# Patient Record
Sex: FEMALE | Race: OTHER | NOT HISPANIC OR LATINO
[De-identification: names, ages, dates, MRNs, and addresses within clinical notes are randomized per-mention and may not be internally consistent; named-entity substitution may affect disease eponyms.]

---

## 2021-10-18 ENCOUNTER — TRANSCRIPTION ENCOUNTER (OUTPATIENT)
Age: 56
End: 2021-10-18

## 2021-10-18 RX ORDER — POVIDONE-IODINE 5 %
1 AEROSOL (ML) TOPICAL ONCE
Refills: 0 | Status: COMPLETED | OUTPATIENT
Start: 2021-10-19 | End: 2021-10-19

## 2021-10-19 ENCOUNTER — RESULT REVIEW (OUTPATIENT)
Age: 56
End: 2021-10-19

## 2021-10-19 ENCOUNTER — INPATIENT (INPATIENT)
Facility: HOSPITAL | Age: 56
LOS: 2 days | Discharge: HOME | DRG: 501 | End: 2021-10-22
Attending: SPECIALIST | Admitting: SPECIALIST
Payer: COMMERCIAL

## 2021-10-19 VITALS
HEIGHT: 64 IN | WEIGHT: 119.27 LBS | TEMPERATURE: 99 F | HEART RATE: 65 BPM | RESPIRATION RATE: 16 BRPM | OXYGEN SATURATION: 100 % | DIASTOLIC BLOOD PRESSURE: 81 MMHG | SYSTOLIC BLOOD PRESSURE: 133 MMHG

## 2021-10-19 DIAGNOSIS — Z98.890 OTHER SPECIFIED POSTPROCEDURAL STATES: ICD-10-CM

## 2021-10-19 DIAGNOSIS — Z87.898 PERSONAL HISTORY OF OTHER SPECIFIED CONDITIONS: Chronic | ICD-10-CM

## 2021-10-19 LAB
ANION GAP SERPL CALC-SCNC: 12 MMOL/L — SIGNIFICANT CHANGE UP (ref 5–17)
BLD GP AB SCN SERPL QL: NEGATIVE — SIGNIFICANT CHANGE UP
BUN SERPL-MCNC: 15 MG/DL — SIGNIFICANT CHANGE UP (ref 7–23)
CALCIUM SERPL-MCNC: 9.1 MG/DL — SIGNIFICANT CHANGE UP (ref 8.4–10.5)
CHLORIDE SERPL-SCNC: 105 MMOL/L — SIGNIFICANT CHANGE UP (ref 96–108)
CO2 SERPL-SCNC: 23 MMOL/L — SIGNIFICANT CHANGE UP (ref 22–31)
CREAT SERPL-MCNC: 0.76 MG/DL — SIGNIFICANT CHANGE UP (ref 0.5–1.3)
GLUCOSE BLDC GLUCOMTR-MCNC: 125 MG/DL — HIGH (ref 70–99)
GLUCOSE SERPL-MCNC: 172 MG/DL — HIGH (ref 70–99)
HCT VFR BLD CALC: 42.6 % — SIGNIFICANT CHANGE UP (ref 34.5–45)
HGB BLD-MCNC: 13.6 G/DL — SIGNIFICANT CHANGE UP (ref 11.5–15.5)
MAGNESIUM SERPL-MCNC: 2 MG/DL — SIGNIFICANT CHANGE UP (ref 1.6–2.6)
MCHC RBC-ENTMCNC: 28.9 PG — SIGNIFICANT CHANGE UP (ref 27–34)
MCHC RBC-ENTMCNC: 31.9 GM/DL — LOW (ref 32–36)
MCV RBC AUTO: 90.6 FL — SIGNIFICANT CHANGE UP (ref 80–100)
NRBC # BLD: 0 /100 WBCS — SIGNIFICANT CHANGE UP (ref 0–0)
PHOSPHATE SERPL-MCNC: 3.5 MG/DL — SIGNIFICANT CHANGE UP (ref 2.5–4.5)
PLATELET # BLD AUTO: 218 K/UL — SIGNIFICANT CHANGE UP (ref 150–400)
POTASSIUM SERPL-MCNC: 3.9 MMOL/L — SIGNIFICANT CHANGE UP (ref 3.5–5.3)
POTASSIUM SERPL-SCNC: 3.9 MMOL/L — SIGNIFICANT CHANGE UP (ref 3.5–5.3)
RBC # BLD: 4.7 M/UL — SIGNIFICANT CHANGE UP (ref 3.8–5.2)
RBC # FLD: 11.4 % — SIGNIFICANT CHANGE UP (ref 10.3–14.5)
RH IG SCN BLD-IMP: POSITIVE — SIGNIFICANT CHANGE UP
SODIUM SERPL-SCNC: 140 MMOL/L — SIGNIFICANT CHANGE UP (ref 135–145)
WBC # BLD: 11.74 K/UL — HIGH (ref 3.8–10.5)
WBC # FLD AUTO: 11.74 K/UL — HIGH (ref 3.8–10.5)

## 2021-10-19 PROCEDURE — 99233 SBSQ HOSP IP/OBS HIGH 50: CPT

## 2021-10-19 PROCEDURE — 88300 SURGICAL PATH GROSS: CPT | Mod: 26

## 2021-10-19 RX ORDER — SODIUM CHLORIDE 9 MG/ML
1000 INJECTION INTRAMUSCULAR; INTRAVENOUS; SUBCUTANEOUS
Refills: 0 | Status: DISCONTINUED | OUTPATIENT
Start: 2021-10-19 | End: 2021-10-20

## 2021-10-19 RX ORDER — SODIUM CHLORIDE 9 MG/ML
1000 INJECTION, SOLUTION INTRAVENOUS
Refills: 0 | Status: DISCONTINUED | OUTPATIENT
Start: 2021-10-19 | End: 2021-10-19

## 2021-10-19 RX ORDER — TRAMADOL HYDROCHLORIDE 50 MG/1
50 TABLET ORAL EVERY 6 HOURS
Refills: 0 | Status: DISCONTINUED | OUTPATIENT
Start: 2021-10-19 | End: 2021-10-21

## 2021-10-19 RX ORDER — DEXTROSE 50 % IN WATER 50 %
25 SYRINGE (ML) INTRAVENOUS ONCE
Refills: 0 | Status: DISCONTINUED | OUTPATIENT
Start: 2021-10-19 | End: 2021-10-20

## 2021-10-19 RX ORDER — DEXTROSE 50 % IN WATER 50 %
12.5 SYRINGE (ML) INTRAVENOUS ONCE
Refills: 0 | Status: DISCONTINUED | OUTPATIENT
Start: 2021-10-19 | End: 2021-10-19

## 2021-10-19 RX ORDER — INSULIN LISPRO 100/ML
VIAL (ML) SUBCUTANEOUS
Refills: 0 | Status: DISCONTINUED | OUTPATIENT
Start: 2021-10-19 | End: 2021-10-20

## 2021-10-19 RX ORDER — CEFAZOLIN SODIUM 1 G
1000 VIAL (EA) INJECTION EVERY 8 HOURS
Refills: 0 | Status: DISCONTINUED | OUTPATIENT
Start: 2021-10-19 | End: 2021-10-21

## 2021-10-19 RX ORDER — FENTANYL CITRATE 50 UG/ML
12.5 INJECTION INTRAVENOUS
Refills: 0 | Status: DISCONTINUED | OUTPATIENT
Start: 2021-10-19 | End: 2021-10-20

## 2021-10-19 RX ORDER — METOCLOPRAMIDE HCL 10 MG
10 TABLET ORAL ONCE
Refills: 0 | Status: COMPLETED | OUTPATIENT
Start: 2021-10-19 | End: 2021-10-19

## 2021-10-19 RX ORDER — SENNA PLUS 8.6 MG/1
2 TABLET ORAL AT BEDTIME
Refills: 0 | Status: DISCONTINUED | OUTPATIENT
Start: 2021-10-19 | End: 2021-10-22

## 2021-10-19 RX ORDER — CEFAZOLIN SODIUM 1 G
1000 VIAL (EA) INJECTION EVERY 8 HOURS
Refills: 0 | Status: DISCONTINUED | OUTPATIENT
Start: 2021-10-19 | End: 2021-10-19

## 2021-10-19 RX ORDER — DEXTROSE 50 % IN WATER 50 %
25 SYRINGE (ML) INTRAVENOUS ONCE
Refills: 0 | Status: DISCONTINUED | OUTPATIENT
Start: 2021-10-19 | End: 2021-10-19

## 2021-10-19 RX ORDER — ONDANSETRON 8 MG/1
4 TABLET, FILM COATED ORAL EVERY 6 HOURS
Refills: 0 | Status: DISCONTINUED | OUTPATIENT
Start: 2021-10-19 | End: 2021-10-22

## 2021-10-19 RX ORDER — CHLORHEXIDINE GLUCONATE 213 G/1000ML
1 SOLUTION TOPICAL EVERY 12 HOURS
Refills: 0 | Status: DISCONTINUED | OUTPATIENT
Start: 2021-10-19 | End: 2021-10-19

## 2021-10-19 RX ORDER — DEXTROSE 50 % IN WATER 50 %
15 SYRINGE (ML) INTRAVENOUS ONCE
Refills: 0 | Status: DISCONTINUED | OUTPATIENT
Start: 2021-10-19 | End: 2021-10-19

## 2021-10-19 RX ORDER — METOCLOPRAMIDE HCL 10 MG
10 TABLET ORAL ONCE
Refills: 0 | Status: DISCONTINUED | OUTPATIENT
Start: 2021-10-19 | End: 2021-10-19

## 2021-10-19 RX ORDER — ACETAMINOPHEN 500 MG
1000 TABLET ORAL EVERY 6 HOURS
Refills: 0 | Status: COMPLETED | OUTPATIENT
Start: 2021-10-19 | End: 2021-10-20

## 2021-10-19 RX ORDER — SODIUM CHLORIDE 9 MG/ML
1000 INJECTION INTRAMUSCULAR; INTRAVENOUS; SUBCUTANEOUS
Refills: 0 | Status: DISCONTINUED | OUTPATIENT
Start: 2021-10-19 | End: 2021-10-19

## 2021-10-19 RX ORDER — GLUCAGON INJECTION, SOLUTION 0.5 MG/.1ML
1 INJECTION, SOLUTION SUBCUTANEOUS ONCE
Refills: 0 | Status: DISCONTINUED | OUTPATIENT
Start: 2021-10-19 | End: 2021-10-19

## 2021-10-19 RX ADMIN — FENTANYL CITRATE 12.5 MICROGRAM(S): 50 INJECTION INTRAVENOUS at 18:36

## 2021-10-19 RX ADMIN — TRAMADOL HYDROCHLORIDE 50 MILLIGRAM(S): 50 TABLET ORAL at 18:36

## 2021-10-19 RX ADMIN — Medication 5 MILLIGRAM(S): at 21:24

## 2021-10-19 RX ADMIN — Medication 100 MILLIGRAM(S): at 21:24

## 2021-10-19 RX ADMIN — Medication 1 APPLICATION(S): at 10:25

## 2021-10-19 RX ADMIN — CHLORHEXIDINE GLUCONATE 1 APPLICATION(S): 213 SOLUTION TOPICAL at 10:26

## 2021-10-19 RX ADMIN — Medication 10 MILLIGRAM(S): at 18:00

## 2021-10-19 RX ADMIN — FENTANYL CITRATE 12.5 MICROGRAM(S): 50 INJECTION INTRAVENOUS at 17:27

## 2021-10-19 RX ADMIN — SENNA PLUS 2 TABLET(S): 8.6 TABLET ORAL at 21:23

## 2021-10-19 RX ADMIN — TRAMADOL HYDROCHLORIDE 50 MILLIGRAM(S): 50 TABLET ORAL at 17:56

## 2021-10-19 RX ADMIN — ONDANSETRON 4 MILLIGRAM(S): 8 TABLET, FILM COATED ORAL at 17:26

## 2021-10-19 RX ADMIN — Medication 1000 MILLIGRAM(S): at 19:51

## 2021-10-19 RX ADMIN — Medication 1000 MILLIGRAM(S): at 20:30

## 2021-10-19 NOTE — PROGRESS NOTE ADULT - SUBJECTIVE AND OBJECTIVE BOX
NEUROSURGERY POST OP NOTE:    POD# 0 S/P cranioplasty revision, removal of hardware and resuspension of temporalis muscle for a right frontal and temporal deformity     S: Patient currently in PACU, reports 8/10 incisional pain and nausea. subgaleal ERICK and head wrap in place.       T(C): 37.1 (10-19-21 @ 10:12), Max: 37.1 (10-19-21 @ 10:12)  HR: 50 (10-19-21 @ 16:45) (49 - 65)  BP: 146/71 (10-19-21 @ 16:45) (133/81 - 164/79)  RR: 11 (10-19-21 @ 16:45) (11 - 16)  SpO2: 100% (10-19-21 @ 16:45) (99% - 100%)        acetaminophen   Tablet .. 1000 milliGRAM(s) Oral every 6 hours  ceFAZolin   IVPB 1000 milliGRAM(s) IV Intermittent every 8 hours  dextrose 50% Injectable 25 Gram(s) IV Push once  fentaNYL    Injectable 12.5 MICROGram(s) IV Push every 3 hours PRN  insulin lispro (ADMELOG) corrective regimen sliding scale   SubCutaneous Before meals and at bedtime  metoclopramide 10 milliGRAM(s) Oral once  ondansetron Injectable 4 milliGRAM(s) IV Push every 6 hours PRN  sodium chloride 0.9%. 1000 milliLiter(s) IV Continuous <Continuous>  traMADol 50 milliGRAM(s) Oral every 6 hours PRN      RADIOLOGY:     Exam:  Constitutional:  56 y/o female awake, alert in no acute distress.  Eyes:  Sclera anicteric, conjunctiva noninjected.  ENMT: Oropharyngeal mucosa moist, pink. Tongue midline.    Neck: Neck supple, FROM.  No appreciable lymphadenopathy.  Back:  No pain to palpation/percussion of low back. No CVA tenderness.  Respiratory: normal chest rise  Cardiovascular: Regular rate and rhythm.   Gastrointestinal:  Soft, nontender, nondistended.   Genitourinary:  Deferred.  Rectal: Deferred.  Vascular: Extremities warm, no ulcers, no discoloration of skin.   Neurological: Gen: AA&O x 3, conversant, appropriate.      CN II-XII grossly intact.    Motor: RESTREPO x 4, 5/5 throughout UE/LE.    Sens: Sensation intact to light touch throughout.    DTRs: 2+ symmetric throughout.    Hoffmans negative bilaterally.  Plantar downgoing bilaterally.  No clonus.      No pronator drift, no dysmetria.  Skin: Warm, dry, no erythema. head wrap and right subgaleal ERICK drain in place.       WOUND/DRAINS:  - right subgaleal drain     DEVICES:       Assessment: 6 yo female, R handed with PMHx of pancreatic cyst, renal cyst, microscopic hematuria,  Right Craniotomy resection of meningioma in 2016, presented for an elective cranioplasty revision, removal of hardware and resuspension of temporalis muscle due to R temporal deformity.       Plan:  NEURO:  - neuro ICU overnight  - neuro checks q1hr  - monitor ERCIK drain outputs  - Fentanyl 12.5mcg IVP q3hrs PRN  - Tramadol 50mg PO q6hrs PRN  - Acetaminophen 1g q6hr    CARDIO:  - normotensive BP     PULM:  - satting well on RA    GI:  - bowel regimen  - advance diet as tolerated    RENAL:  - IVF   - normonatremia    HEME:  - SCDs    ID:  - afebrile  - post-op Ancef    ENDO:  - ISS  - normal glucose goal    DISPO:  - pend PT/OT    Case discussed with Dr. Mackay         NEUROSURGERY POST OP NOTE:    POD# 0 S/P cranioplasty revision, removal of hardware and resuspension of temporalis muscle for a right frontal and temporal deformity     S: Patient currently in PACU, reports 8/10 incisional pain and nausea. subgaleal ERICK and head wrap in place.       T(C): 37.1 (10-19-21 @ 10:12), Max: 37.1 (10-19-21 @ 10:12)  HR: 50 (10-19-21 @ 16:45) (49 - 65)  BP: 146/71 (10-19-21 @ 16:45) (133/81 - 164/79)  RR: 11 (10-19-21 @ 16:45) (11 - 16)  SpO2: 100% (10-19-21 @ 16:45) (99% - 100%)        acetaminophen   Tablet .. 1000 milliGRAM(s) Oral every 6 hours  ceFAZolin   IVPB 1000 milliGRAM(s) IV Intermittent every 8 hours  dextrose 50% Injectable 25 Gram(s) IV Push once  fentaNYL    Injectable 12.5 MICROGram(s) IV Push every 3 hours PRN  insulin lispro (ADMELOG) corrective regimen sliding scale   SubCutaneous Before meals and at bedtime  metoclopramide 10 milliGRAM(s) Oral once  ondansetron Injectable 4 milliGRAM(s) IV Push every 6 hours PRN  sodium chloride 0.9%. 1000 milliLiter(s) IV Continuous <Continuous>  traMADol 50 milliGRAM(s) Oral every 6 hours PRN      RADIOLOGY:       Exam:  Constitutional:  56 y/o female awake, alert in no acute distress.  Eyes:  Sclera anicteric, conjunctiva noninjected.  ENMT: Oropharyngeal mucosa moist, pink. Tongue midline.    Neck: Neck supple, FROM.  No appreciable lymphadenopathy.  Back:  No pain to palpation/percussion of low back. No CVA tenderness.  Respiratory: normal chest rise  Cardiovascular: Regular rate and rhythm.   Gastrointestinal:  Soft, nontender, nondistended.   Genitourinary:  Deferred.  Rectal: Deferred.  Vascular: Extremities warm, no ulcers, no discoloration of skin.   Neurological: Gen: AA&O x 3, conversant, appropriate.      CN II-XII grossly intact.    Motor: RESTREPO x 4, 5/5 throughout UE/LE.    Sens: Sensation intact to light touch throughout.    DTRs: 2+ symmetric throughout.    Hoffmans negative bilaterally.  Plantar downgoing bilaterally.  No clonus.      No pronator drift, no dysmetria.  Skin: Warm, dry, no erythema. head wrap and right subgaleal ERICK drain in place.       WOUND/DRAINS:  - right subgaleal drain     DEVICES:       Assessment: 54 yo female, R handed with PMHx of pancreatic cyst, renal cyst, microscopic hematuria,  Right Craniotomy resection of meningioma in 2016, presented for an elective cranioplasty revision, removal of hardware and resuspension of temporalis muscle due to R temporal deformity.       Plan:  NEURO:  - neuro ICU overnight  - neuro checks q1hr  - monitor ERICK drain outputs  - Fentanyl 12.5mcg IVP q3hrs PRN  - Tramadol 50mg PO q6hrs PRN  - Acetaminophen 1g q6hr    CARDIO:  - normotensive BP     PULM:  - satting well on RA    GI:  - bowel regimen  - advance diet as tolerated    RENAL:  - IVF   - normonatremia    HEME:  - SCDs    ID:  - afebrile  - post-op Ancef    ENDO:  - ISS  - normal glucose goal    DISPO:  - pend PT/OT    Case discussed with Dr. Mackay

## 2021-10-19 NOTE — BRIEF OPERATIVE NOTE - NSICDXBRIEFPROCEDURE_GEN_ALL_CORE_FT
PROCEDURES:  Cranioplasty for skull defect up to 5 cm diameter 19-Oct-2021 14:49:32 cranioplasty revision, removal of hardware and resuspensionof temporalis muscle for a right frontal and temporal deformity Ji Olmos

## 2021-10-19 NOTE — H&P ADULT - HISTORY OF PRESENT ILLNESS
Pt is 54 yo female, R handed, PMH: pancreatic cyst, renal cyst, microscopic hematuria, 2016 Right Craniotomy resection of meningioma, presents for an elective  cranioplasty revision, removal of hardware and resuspension of temporalis muscle due to R temporal deformity and pain/discomfort when lays on right side of her head . Pt denies sob, cp, n/v, chills/fevers, dysuria.

## 2021-10-19 NOTE — H&P ADULT - ASSESSMENT
Pt is 56 yo female, R handed, PMH: pancreatic cyst, renal cyst, microscopic hematuria, 2016 Right Craniotomy resection of meningioma, presents for an elective  cranioplasty revision, removal of hardware and resuspension of temporalis muscle due to R temporal deformity and pain/discomfort when lays on right side of her head . Pt denies sob, cp, n/v, chills/fevers, dysuria.

## 2021-10-19 NOTE — PROGRESS NOTE ADULT - ASSESSMENT
Assessment: 54 y/o female POD 0 s/p elective cranioplasty revision, removal of hardware and resuspension of temporalis muscle.    Plan:  NEURO:  Neurochecks q1hr  Nonitor ERICK drain outputs  Analgesics prn   Activity: Bedrest for now.     CARDIO:  -150    PULM:  Incentive spirometry  Keep sats >92%    GI:  Advance diet as tolerated  Bowel regimen    RENAL:  IV hydration for now  IVL once tolerating  Replete lytes prn    HEME:  Post op labs  VTE ppx: SCDs.     ID:  Afebrile  Mild post op leukocytosis- likely reactive.   Afebrile  Post-op Ancef. Continue Ancef until drain removed    ENDO:  ISS    SOCIAL/FAMILY:  [x] awaiting [] updated at bedside [] family meeting    CODE STATUS:  [x] Full Code [] DNR [] DNI [] Palliative/Comfort Care    DISPOSITION:  [x] ICU [] Stroke Unit [] Floor [] EMU [] RCU [] PCU      Time spent: 40 critical care minutes

## 2021-10-19 NOTE — PROGRESS NOTE ADULT - SUBJECTIVE AND OBJECTIVE BOX
HPI:  Pt is 56 y/o right handed female with PMH right craniotomy resection of meningioma in 2016. Also has history of pancreatic cyst, renal cyst, microscopic hematuria. She presented for an elective cranioplasty revision, removal of hardware and resuspension of temporalis muscle due to R temporal deformity and pain/discomfort when lays on right side of her head. Pt denies sob, cp, n/v, chills/fevers, dysuria. (19 Oct 2021 11:45)    On admission, the patient was:  GCS: 15  Hunt-Torres:  modified Rios:  ICH score:  NIHSS:      ICU Vital Signs Last 24 Hrs  T(C): 37.1 (19 Oct 2021 10:12), Max: 37.1 (19 Oct 2021 10:12)  T(F): 98.8 (19 Oct 2021 10:12), Max: 98.8 (19 Oct 2021 10:12)  HR: 50 (19 Oct 2021 16:45) (49 - 65)  BP: 146/71 (19 Oct 2021 16:45) (133/81 - 164/79)  BP(mean): 103 (19 Oct 2021 16:45) (103 - 111)  RR: 11 (19 Oct 2021 16:45) (11 - 16)  SpO2: 100% (19 Oct 2021 16:45) (99% - 100%)      EXAMINATION:  General: Calm  HEENT:  MMM  Neuro: Seen immediately postop: Awake, alert, oriented x 3, follows commands, moves all extremities at least antigravity  Cards:  S1/S 2, RRR  Respiratory: Clear  Abdomen: Soft, non tender  Extremities: No edema  Skin: Warm/dry. Dressing C/D/I.    MEDICATIONS:   acetaminophen   Tablet .. 1000 milliGRAM(s) Oral every 6 hours  bisacodyl 5 milliGRAM(s) Oral at bedtime  ceFAZolin   IVPB 1000 milliGRAM(s) IV Intermittent every 8 hours  dextrose 50% Injectable 25 Gram(s) IV Push once  fentaNYL    Injectable 12.5 MICROGram(s) IV Push every 3 hours PRN  insulin lispro (ADMELOG) corrective regimen sliding scale   SubCutaneous Before meals and at bedtime  metoclopramide 10 milliGRAM(s) Oral once  ondansetron Injectable 4 milliGRAM(s) IV Push every 6 hours PRN  senna 2 Tablet(s) Oral at bedtime  sodium chloride 0.9%. 1000 milliLiter(s) (75 mL/Hr) IV Continuous <Continuous>  traMADol 50 milliGRAM(s) Oral every 6 hours PRN      LABS:                       13.6   11.74 )-----------( 218      ( 19 Oct 2021 17:18 )             42.6     10-19    140  |  105  |  15  ----------------------------<  172<H>  3.9   |  23  |  0.76    Ca    9.1      19 Oct 2021 17:18  Phos  3.5     10-19  Mg     2.0     10-19

## 2021-10-19 NOTE — PACU DISCHARGE NOTE - COMMENTS
Report given to eRgulo STEWART. transported in bed on Room air in bed with monitor, IV site intact. Pt in NAD,

## 2021-10-20 LAB
A1C WITH ESTIMATED AVERAGE GLUCOSE RESULT: 5.3 % — SIGNIFICANT CHANGE UP (ref 4–5.6)
ANION GAP SERPL CALC-SCNC: 9 MMOL/L — SIGNIFICANT CHANGE UP (ref 5–17)
BUN SERPL-MCNC: 14 MG/DL — SIGNIFICANT CHANGE UP (ref 7–23)
CALCIUM SERPL-MCNC: 9 MG/DL — SIGNIFICANT CHANGE UP (ref 8.4–10.5)
CHLORIDE SERPL-SCNC: 106 MMOL/L — SIGNIFICANT CHANGE UP (ref 96–108)
CO2 SERPL-SCNC: 25 MMOL/L — SIGNIFICANT CHANGE UP (ref 22–31)
COVID-19 SPIKE DOMAIN AB INTERP: POSITIVE
COVID-19 SPIKE DOMAIN ANTIBODY RESULT: 98.6 U/ML — HIGH
CREAT SERPL-MCNC: 0.66 MG/DL — SIGNIFICANT CHANGE UP (ref 0.5–1.3)
ESTIMATED AVERAGE GLUCOSE: 105 MG/DL — SIGNIFICANT CHANGE UP (ref 68–114)
GLUCOSE BLDC GLUCOMTR-MCNC: 109 MG/DL — HIGH (ref 70–99)
GLUCOSE SERPL-MCNC: 124 MG/DL — HIGH (ref 70–99)
HCT VFR BLD CALC: 38.9 % — SIGNIFICANT CHANGE UP (ref 34.5–45)
HGB BLD-MCNC: 12.8 G/DL — SIGNIFICANT CHANGE UP (ref 11.5–15.5)
MAGNESIUM SERPL-MCNC: 2.1 MG/DL — SIGNIFICANT CHANGE UP (ref 1.6–2.6)
MCHC RBC-ENTMCNC: 29.6 PG — SIGNIFICANT CHANGE UP (ref 27–34)
MCHC RBC-ENTMCNC: 32.9 GM/DL — SIGNIFICANT CHANGE UP (ref 32–36)
MCV RBC AUTO: 90 FL — SIGNIFICANT CHANGE UP (ref 80–100)
NRBC # BLD: 0 /100 WBCS — SIGNIFICANT CHANGE UP (ref 0–0)
PHOSPHATE SERPL-MCNC: 4.5 MG/DL — SIGNIFICANT CHANGE UP (ref 2.5–4.5)
PLATELET # BLD AUTO: 238 K/UL — SIGNIFICANT CHANGE UP (ref 150–400)
POTASSIUM SERPL-MCNC: 4.4 MMOL/L — SIGNIFICANT CHANGE UP (ref 3.5–5.3)
POTASSIUM SERPL-SCNC: 4.4 MMOL/L — SIGNIFICANT CHANGE UP (ref 3.5–5.3)
RBC # BLD: 4.32 M/UL — SIGNIFICANT CHANGE UP (ref 3.8–5.2)
RBC # FLD: 11.5 % — SIGNIFICANT CHANGE UP (ref 10.3–14.5)
SARS-COV-2 IGG+IGM SERPL QL IA: 98.6 U/ML — HIGH
SARS-COV-2 IGG+IGM SERPL QL IA: POSITIVE
SODIUM SERPL-SCNC: 140 MMOL/L — SIGNIFICANT CHANGE UP (ref 135–145)
WBC # BLD: 12.1 K/UL — HIGH (ref 3.8–10.5)
WBC # FLD AUTO: 12.1 K/UL — HIGH (ref 3.8–10.5)

## 2021-10-20 PROCEDURE — 99233 SBSQ HOSP IP/OBS HIGH 50: CPT

## 2021-10-20 PROCEDURE — 99024 POSTOP FOLLOW-UP VISIT: CPT

## 2021-10-20 PROCEDURE — 70450 CT HEAD/BRAIN W/O DYE: CPT | Mod: 26

## 2021-10-20 RX ORDER — SODIUM CHLORIDE 9 MG/ML
1000 INJECTION INTRAMUSCULAR; INTRAVENOUS; SUBCUTANEOUS
Refills: 0 | Status: DISCONTINUED | OUTPATIENT
Start: 2021-10-20 | End: 2021-10-21

## 2021-10-20 RX ORDER — HYDROMORPHONE HYDROCHLORIDE 2 MG/ML
0.25 INJECTION INTRAMUSCULAR; INTRAVENOUS; SUBCUTANEOUS ONCE
Refills: 0 | Status: DISCONTINUED | OUTPATIENT
Start: 2021-10-20 | End: 2021-10-20

## 2021-10-20 RX ADMIN — TRAMADOL HYDROCHLORIDE 50 MILLIGRAM(S): 50 TABLET ORAL at 16:18

## 2021-10-20 RX ADMIN — Medication 1000 MILLIGRAM(S): at 07:00

## 2021-10-20 RX ADMIN — Medication 100 MILLIGRAM(S): at 05:39

## 2021-10-20 RX ADMIN — HYDROMORPHONE HYDROCHLORIDE 0.25 MILLIGRAM(S): 2 INJECTION INTRAMUSCULAR; INTRAVENOUS; SUBCUTANEOUS at 23:15

## 2021-10-20 RX ADMIN — SENNA PLUS 2 TABLET(S): 8.6 TABLET ORAL at 21:05

## 2021-10-20 RX ADMIN — Medication 100 MILLIGRAM(S): at 21:06

## 2021-10-20 RX ADMIN — Medication 100 MILLIGRAM(S): at 13:53

## 2021-10-20 RX ADMIN — Medication 1000 MILLIGRAM(S): at 11:32

## 2021-10-20 RX ADMIN — HYDROMORPHONE HYDROCHLORIDE 0.25 MILLIGRAM(S): 2 INJECTION INTRAMUSCULAR; INTRAVENOUS; SUBCUTANEOUS at 22:37

## 2021-10-20 RX ADMIN — Medication 5 MILLIGRAM(S): at 21:06

## 2021-10-20 RX ADMIN — TRAMADOL HYDROCHLORIDE 50 MILLIGRAM(S): 50 TABLET ORAL at 22:04

## 2021-10-20 RX ADMIN — TRAMADOL HYDROCHLORIDE 50 MILLIGRAM(S): 50 TABLET ORAL at 15:54

## 2021-10-20 RX ADMIN — Medication 1000 MILLIGRAM(S): at 12:26

## 2021-10-20 RX ADMIN — Medication 1000 MILLIGRAM(S): at 06:02

## 2021-10-20 RX ADMIN — TRAMADOL HYDROCHLORIDE 50 MILLIGRAM(S): 50 TABLET ORAL at 21:04

## 2021-10-20 NOTE — OCCUPATIONAL THERAPY INITIAL EVALUATION ADULT - DIAGNOSIS, OT EVAL
Pt presents with R facial swelling reporting difficulties to produce tongue however at baseline/ WFL for ADLs and functional mobility.

## 2021-10-20 NOTE — PHYSICAL THERAPY INITIAL EVALUATION ADULT - PERTINENT HX OF CURRENT PROBLEM, REHAB EVAL
56 yo female, R handed, PMH: pancreatic cyst, renal cyst, microscopic hematuria, 2016 Right Craniotomy resection of meningioma, presents for an elective  cranioplasty revision, removal of hardware and resuspension of temporalis muscle due to R temporal deformity and pain/discomfort when lays on right side of her head . Pt denies sob, cp, n/v, chills/fevers, dysuria.

## 2021-10-20 NOTE — PHYSICAL THERAPY INITIAL EVALUATION ADULT - ADDITIONAL COMMENTS
Pt. resides in [facility], wears glasses for distance, no device use. Will be working from home remainder of year, otherwise takes taxis and trains.

## 2021-10-20 NOTE — PHYSICAL THERAPY INITIAL EVALUATION ADULT - MODALITIES TREATMENT COMMENTS
CN testing II-XII - face symmetric (right orbital edema), tongue with deviation to right (states she cannot open mouth fully-team aware), vision intact with smooth pursuit, peripheral fields intact.

## 2021-10-20 NOTE — OCCUPATIONAL THERAPY INITIAL EVALUATION ADULT - LIVES WITH, PROFILE
Pt lives with  in apt with no stairs. Pt at baseline is ind for ADLs and functional mobility. +shower/tub/spouse

## 2021-10-20 NOTE — PHYSICAL THERAPY INITIAL EVALUATION ADULT - DID THE PATIENT HAVE SURGERY?
cranioplasty revision, removal of hardware and resuspensionof temporalis muscle for a right frontal and temporal deformity/yes

## 2021-10-20 NOTE — OCCUPATIONAL THERAPY INITIAL EVALUATION ADULT - MODALITIES TREATMENT COMMENTS
Cranial Nerves II - XII: II: PERRLA; visual fields are full to confrontation III, IV, VI: EOMI, no nystagmus appreciated V: facial sensation intact to light touch V1-V3 b/l VII: no ptosis, no facial droop, symmetric eyebrow raise and closure VIII: hearing intact to finger rub b/l  XI: head turning and shoulder shrug intact b/l XII: tongue protrusion LIMITED 2/2 pt reports she feels her tongue being pulled back- MD Barron notified. Pt performed functional mobility in hallway ind without AD- no LOB noted.

## 2021-10-20 NOTE — PHYSICAL THERAPY INITIAL EVALUATION ADULT - GENERAL OBSERVATIONS, REHAB EVAL
Pt. received in bed in no acute distress, +ERICK, SCD, EKG, heplock, cranial dressing C/D/I, right orbital edema.

## 2021-10-20 NOTE — PROGRESS NOTE ADULT - SUBJECTIVE AND OBJECTIVE BOX
HPI:  Pt is 56 yo female, R handed, PMH: pancreatic cyst, renal cyst, microscopic hematuria, 2016 Right Craniotomy resection of meningioma, presents for an elective cranioplasty revision, removal of hardware and resuspension of temporalis muscle due to R temporal deformity and pain/discomfort when lays on right side of her head . Pt denies sob, cp, n/v, chills/fevers, dysuria.    Hospital Course:   10/19: s/p cranioplasty revision, removal of hardware and resuspension of temporalis muscle due to R temporal deformity. monitor ERICK drain outputs. pain control.  10/20: POD1, COOPER overnight, neuro stable. Pending CTH in AM    Vital Signs Last 24 Hrs  T(C): 36.7 (20 Oct 2021 01:15), Max: 37.1 (19 Oct 2021 10:12)  T(F): 98 (20 Oct 2021 01:15), Max: 98.8 (19 Oct 2021 10:12)  HR: 52 (20 Oct 2021 00:30) (49 - 72)  BP: 139/63 (20 Oct 2021 00:30) (129/61 - 164/79)  BP(mean): 90 (20 Oct 2021 00:30) (86 - 111)  RR: 16 (20 Oct 2021 00:30) (10 - 18)  SpO2: 96% (20 Oct 2021 00:30) (96% - 100%)    I&O's Detail    19 Oct 2021 07:01  -  20 Oct 2021 02:09  --------------------------------------------------------  IN:    IV PiggyBack: 50 mL    sodium chloride 0.9%: 325 mL  Total IN: 375 mL    OUT:    Bulb (mL): 10 mL    Voided (mL): 350 mL  Total OUT: 360 mL    Total NET: 15 mL        I&O's Summary    19 Oct 2021 07:01  -  20 Oct 2021 02:09  --------------------------------------------------------  IN: 375 mL / OUT: 360 mL / NET: 15 mL        PHYSICAL EXAM:  Constitutional: NAD, well groomed  Respiratory: breathing non-labored, symmetrical chest wall movement  Cardiovascuar: RRR, no murmurs  Gastrointestinal: abdomen soft, non tender  Genitourinary: exam deffered  Neurological:  AAOX3. Face symmetric, Verbal function intact, speech clear  Cranial Nerves: II-XII intact  Motor: 5/5 power in b/l upper extremities and lower extremities  Sensation: intact to light touch in all extremities  Pronator Drift: none  Dysmetria: none  Extremities: distal pulses 2+ x4  Wound/incision: Headwrap in place  Drain: 1 subgaleal ERICK      TUBES/LINES:  [] CVC  [] A-line  [] Lumbar Drain  [] Ventriculostomy  [x] Other R subgaleal ERICK in place    DIET:  [] NPO  [x] Mechanical  [] Tube feeds    LABS:                        13.6   11.74 )-----------( 218      ( 19 Oct 2021 17:18 )             42.6     10-19    140  |  105  |  15  ----------------------------<  172<H>  3.9   |  23  |  0.76    Ca    9.1      19 Oct 2021 17:18  Phos  3.5     10-19  Mg     2.0     10-19              CAPILLARY BLOOD GLUCOSE      POCT Blood Glucose.: 125 mg/dL (19 Oct 2021 21:26)      Drug Levels: [] N/A    CSF Analysis: [] N/A      Allergies    No Known Allergies    Intolerances      MEDICATIONS:  Antibiotics:  ceFAZolin   IVPB 1000 milliGRAM(s) IV Intermittent every 8 hours    Neuro:  acetaminophen   Tablet .. 1000 milliGRAM(s) Oral every 6 hours  fentaNYL    Injectable 12.5 MICROGram(s) IV Push every 3 hours PRN  ondansetron Injectable 4 milliGRAM(s) IV Push every 6 hours PRN  traMADol 50 milliGRAM(s) Oral every 6 hours PRN    Anticoagulation:    OTHER:  bisacodyl 5 milliGRAM(s) Oral at bedtime  dextrose 50% Injectable 25 Gram(s) IV Push once  insulin lispro (ADMELOG) corrective regimen sliding scale   SubCutaneous Before meals and at bedtime  senna 2 Tablet(s) Oral at bedtime    IVF:  sodium chloride 0.9%. 1000 milliLiter(s) IV Continuous <Continuous>    CULTURES:    RADIOLOGY & ADDITIONAL TESTS:      ASSESSMENT:  56 yo female, R handed with PMHx of pancreatic cyst, renal cyst, microscopic hematuria, Right Craniotomy resection of meningioma in 2016, presented for an elective cranioplasty revision, removal of hardware and resuspension of temporalis muscle due to R temporal deformity.       Plan:  NEURO:  - neuro checks q1hr  - monitor ERICK drain output  - Fentanyl 12.5mcg IVP q3hrs PRN  - Tramadol 50mg PO q6hrs PRN  - Acetaminophen 1g q6hr  - Pending CTH post op 10/20 AM    CARDIO:  - normotensive BP     PULM:  - satting well on RA    GI:  - bowel regimen  - advance diet as tolerated    RENAL:  - IVF until adequate PO intake  - normonatremia    HEME:  - SCDs    ID:  - afebrile  - post-op Ancef (KEEP ANCEF UNTIL DRAIN REMOVED)    ENDO:  - ISS  - normal glucose goal    DISPO:  - pend PT/OT    Case discussed with Dr. Mackay and Dr. Prieto

## 2021-10-20 NOTE — PROGRESS NOTE ADULT - ASSESSMENT
Assessment: 54 y/o female POD 1 s/p elective cranioplasty revision, removal of hardware and resuspension of temporalis muscle.    Plan:  NEURO:  Neurochecks Q4hr  Monitor ERICK drain outputs  Analgesics prn   Activity: Bedrest for now.     CARDIO:  -150    PULM:  Incentive spirometry  Keep sats >92%    GI:  Advance diet as tolerated  Bowel regimen    RENAL:  IV hydration for now  IVL   Replete lytes prn    HEME:  Post op labs  VTE ppx: SCDs.     ID:  Afebrile  Mild post op leukocytosis- likely reactive.   Afebrile  Post-op Ancef. Continue Ancef until drain removed    ENDO:  ISS    SOCIAL/FAMILY:  [x] awaiting [] updated at bedside [] family meeting    CODE STATUS:  [x] Full Code [] DNR [] DNI [] Palliative/Comfort Care    DISPOSITION:  [] ICU [] Stroke Unit [x] Floor [] EMU [] RCU [] PCU    ICU stepdown Checklist:    Completed: 10-20 @ 07:29    [X] hemodynamically stable – VS WNL and stable x 24hours, UO adequate  [n/a ] if  previously on HDA - off pressors x 24h with stable neuro exam    [X] no new symptoms x 24h (i.e. new fever, new-onset nausea/vomiting)  [X] stable labs: (i.e. WBC not rising, sodium not dropping)  [X] patient not at high risk for aspiration, if high risk then:                  [ ] should have definitive plans for trach/PEG (alternative option is to discharge from ICU to facilty)                  [ ] stepdown to bed close to nurse’s station  [n/a] low suctioning requirements (i.e. q4h or less)  [X] sign-off from primary RN*  [X] drains do not require ICU level of care  [X] if patient previously agitated or with behavioral issues – controlled   [X] pain controlled         Assessment: 56 y/o female POD 1 s/p elective cranioplasty revision, removal of hardware and resuspension of temporalis muscle.    Plan:  NEURO:  Neurochecks Q4hr  Monitor ERICK drain outputs  CT head 10/20  Analgesics prn   Activity: PT/OT. As tolerated    CARDIO:  -150    PULM:  Incentive spirometry  Keep sats >92%    GI:  Advance diet as tolerated  Bowel regimen    RENAL:  IVL   Replete lytes prn    HEME:  Hb stable  VTE ppx: SCDs  Chemoppx pending stable CThead post op    ID:  Afebrile  Mild post op leukocytosis- likely reactive.   Afebrile  Post-op Ancef. Continue Ancef until drain removed    ENDO:  A1c 5.3  DC fingersticks. No longer needed    SOCIAL/FAMILY:  [x] awaiting [] updated at bedside [] family meeting    CODE STATUS:  [x] Full Code [] DNR [] DNI [] Palliative/Comfort Care    DISPOSITION:  [] ICU [] Stroke Unit [x] Floor [] EMU [] RCU [] PCU    ICU stepdown Checklist:    Completed: 10-20 @ 07:29    [X] hemodynamically stable – VS WNL and stable x 24hours, UO adequate  [n/a ] if  previously on HDA - off pressors x 24h with stable neuro exam    [X] no new symptoms x 24h (i.e. new fever, new-onset nausea/vomiting)  [X] stable labs: (i.e. WBC not rising, sodium not dropping)  [X] patient not at high risk for aspiration, if high risk then:                  [ ] should have definitive plans for trach/PEG (alternative option is to discharge from ICU to facilty)                  [ ] stepdown to bed close to nurse’s station  [n/a] low suctioning requirements (i.e. q4h or less)  [X] sign-off from primary RN*  [X] drains do not require ICU level of care  [X] if patient previously agitated or with behavioral issues – controlled   [X] pain controlled

## 2021-10-20 NOTE — OCCUPATIONAL THERAPY INITIAL EVALUATION ADULT - MD ORDER
54 y/o right handed female with PMH right craniotomy resection of meningioma in 2016. Also has history of pancreatic cyst, renal cyst, microscopic hematuria. She presented for an elective cranioplasty revision, removal of hardware and resuspension of temporalis muscle due to R temporal deformity and pain/discomfort when lays on right side of her head. POD 1 s/p elective cranioplasty revision, removal of hardware and resuspension of temporalis muscle.

## 2021-10-20 NOTE — PROGRESS NOTE ADULT - SUBJECTIVE AND OBJECTIVE BOX
HPI:  Pt is 54 y/o right handed female with PMH right craniotomy resection of meningioma in 2016. Also has history of pancreatic cyst, renal cyst, microscopic hematuria. She presented for an elective cranioplasty revision, removal of hardware and resuspension of temporalis muscle due to R temporal deformity and pain/discomfort when lays on right side of her head. Pt denies sob, cp, n/v, chills/fevers, dysuria. (19 Oct 2021 11:45)    On admission, the patient was:  GCS: 15  Hunt-Torres:  modified Rios:  ICH score:  NIHSS:      ICU Vital Signs Last 24 Hrs  T(C): 36.6 (20 Oct 2021 06:39), Max: 37.1 (19 Oct 2021 10:12)  T(F): 97.9 (20 Oct 2021 06:39), Max: 98.8 (19 Oct 2021 10:12)  HR: 47 (20 Oct 2021 07:15) (47 - 72)  BP: 139/65 (20 Oct 2021 07:15) (119/57 - 164/79)  BP(mean): 93 (20 Oct 2021 07:15) (82 - 111)  RR: 17 (20 Oct 2021 07:15) (10 - 18)  SpO2: 98% (20 Oct 2021 07:15) (96% - 100%)      10-19-21 @ 07:01  -  10-20-21 @ 07:00  --------------------------------------------------------  IN: 650 mL / OUT: 370 mL / NET: 280 mL      EXAMINATION:  General: Calm  HEENT:  MMM  Neuro: : Awake, alert, oriented x 3, follows commands, moves all extremities at least antigravity  Cards:  S1/S 2, RRR  Respiratory: Clear  Abdomen: Soft, non tender  Extremities: No edema  Skin: Warm/dry. Dressing C/D/I.    MEDICATIONS:   acetaminophen   Tablet .. 1000 milliGRAM(s) Oral every 6 hours  bisacodyl 5 milliGRAM(s) Oral at bedtime  ceFAZolin   IVPB 1000 milliGRAM(s) IV Intermittent every 8 hours  dextrose 50% Injectable 25 Gram(s) IV Push once  fentaNYL    Injectable 12.5 MICROGram(s) IV Push every 3 hours PRN  insulin lispro (ADMELOG) corrective regimen sliding scale   SubCutaneous Before meals and at bedtime  ondansetron Injectable 4 milliGRAM(s) IV Push every 6 hours PRN  senna 2 Tablet(s) Oral at bedtime  sodium chloride 0.9%. 1000 milliLiter(s) (50 mL/Hr) IV Continuous <Continuous>  traMADol 50 milliGRAM(s) Oral every 6 hours PRN     LABS:                      12.8   12.10 )-----------( 238      ( 20 Oct 2021 05:39 )             38.9     10-20    140  |  106  |  14  ----------------------------<  124<H>  4.4   |  25  |  0.66    Ca    9.0      20 Oct 2021 05:39  Phos  4.5     10-20  Mg     2.1     10-20               HPI:  Pt is 54 y/o right handed female with PMH right craniotomy resection of meningioma in 2016. Also has history of pancreatic cyst, renal cyst, microscopic hematuria. She presented for an elective cranioplasty revision, removal of hardware and resuspension of temporalis muscle due to R temporal deformity and pain/discomfort when lays on right side of her head. Pt denies sob, cp, n/v, chills/fevers, dysuria. (19 Oct 2021 11:45)    On admission, the patient was:  GCS: 15  Hunt-Torres:  modified Rios:  ICH score:  NIHSS:      ICU Vital Signs Last 24 Hrs  T(C): 36.6 (20 Oct 2021 06:39), Max: 37.1 (19 Oct 2021 10:12)  T(F): 97.9 (20 Oct 2021 06:39), Max: 98.8 (19 Oct 2021 10:12)  HR: 47 (20 Oct 2021 07:15) (47 - 72)  BP: 139/65 (20 Oct 2021 07:15) (119/57 - 164/79)  BP(mean): 93 (20 Oct 2021 07:15) (82 - 111)  RR: 17 (20 Oct 2021 07:15) (10 - 18)  SpO2: 98% (20 Oct 2021 07:15) (96% - 100%)      10-19-21 @ 07:01  -  10-20-21 @ 07:00  --------------------------------------------------------  IN: 650 mL / OUT: 370 mL / NET: 280 mL      EXAMINATION:  General: Calm  HEENT:  MMM  Neuro: : Awake, alert, oriented x 3, follows commands, moves all extremities 5/5, sensation intact. No drifts.   Cards:  S1/S 2, RRR  Respiratory: Clear  Abdomen: Soft, non tender  Extremities: No edema  Skin: Warm/dry. Dressing C/D/I.    MEDICATIONS:   acetaminophen   Tablet .. 1000 milliGRAM(s) Oral every 6 hours  bisacodyl 5 milliGRAM(s) Oral at bedtime  ceFAZolin   IVPB 1000 milliGRAM(s) IV Intermittent every 8 hours  dextrose 50% Injectable 25 Gram(s) IV Push once  fentaNYL    Injectable 12.5 MICROGram(s) IV Push every 3 hours PRN  insulin lispro (ADMELOG) corrective regimen sliding scale   SubCutaneous Before meals and at bedtime  ondansetron Injectable 4 milliGRAM(s) IV Push every 6 hours PRN  senna 2 Tablet(s) Oral at bedtime  sodium chloride 0.9%. 1000 milliLiter(s) (50 mL/Hr) IV Continuous <Continuous>  traMADol 50 milliGRAM(s) Oral every 6 hours PRN     LABS:                      12.8   12.10 )-----------( 238      ( 20 Oct 2021 05:39 )             38.9     10-20    140  |  106  |  14  ----------------------------<  124<H>  4.4   |  25  |  0.66    Ca    9.0      20 Oct 2021 05:39  Phos  4.5     10-20  Mg     2.1     10-20

## 2021-10-20 NOTE — OCCUPATIONAL THERAPY INITIAL EVALUATION ADULT - ADDITIONAL COMMENTS
Pt reports after her initial sx ~5 years ago, pt was discharge home with no needs and has been ind with all ADLs and functional mobility since.

## 2021-10-20 NOTE — OCCUPATIONAL THERAPY INITIAL EVALUATION ADULT - GENERAL OBSERVATIONS, REHAB EVAL
Pt received semisupine in bed NAD, +IVL, +tele, + head dressing C/I/D, + crani ERICK drain, + B SCDs.

## 2021-10-20 NOTE — OCCUPATIONAL THERAPY INITIAL EVALUATION ADULT - VISUAL ACUITY
Pt presents with R facial/ eye swelling; reports vision intact/ no visual changes since sx. Pt at baseline if near sighted and wears glasses./not tested

## 2021-10-20 NOTE — PROGRESS NOTE ADULT - SUBJECTIVE AND OBJECTIVE BOX
10/20: S/p cranioplasty, placement of cement, revision of hardware, suspension of temporalis. NAEON. Dressings c/d/i. Doing well. ERICK scalp with 10 cc ss output      Objective:  VITAL SIGNS:  ICU Vital Signs Last 24 Hrs  T(C): 36.6 (20 Oct 2021 06:39), Max: 37.1 (19 Oct 2021 10:12)  T(F): 97.9 (20 Oct 2021 06:39), Max: 98.8 (19 Oct 2021 10:12)  HR: 47 (20 Oct 2021 07:15) (47 - 72)  BP: 139/65 (20 Oct 2021 07:15) (119/57 - 164/79)  BP(mean): 93 (20 Oct 2021 07:15) (82 - 111)  ABP: --  ABP(mean): --  RR: 17 (20 Oct 2021 07:15) (10 - 18)  SpO2: 98% (20 Oct 2021 07:15) (96% - 100%)      MEDICATIONS  (STANDING):  acetaminophen   Tablet .. 1000 milliGRAM(s) Oral every 6 hours  bisacodyl 5 milliGRAM(s) Oral at bedtime  ceFAZolin   IVPB 1000 milliGRAM(s) IV Intermittent every 8 hours  dextrose 50% Injectable 25 Gram(s) IV Push once  insulin lispro (ADMELOG) corrective regimen sliding scale   SubCutaneous Before meals and at bedtime  senna 2 Tablet(s) Oral at bedtime  sodium chloride 0.9%. 1000 milliLiter(s) (50 mL/Hr) IV Continuous <Continuous>    MEDICATIONS  (PRN):  fentaNYL    Injectable 12.5 MICROGram(s) IV Push every 3 hours PRN Severe Pain (7 - 10)  ondansetron Injectable 4 milliGRAM(s) IV Push every 6 hours PRN Nausea and/or Vomiting  traMADol 50 milliGRAM(s) Oral every 6 hours PRN Moderate Pain (4 - 6)      LABS                         12.8   12.10 )-----------( 238      ( 20 Oct 2021 05:39 )             38.9    10-20    140  |  106  |  14  ----------------------------<  124<H>  4.4   |  25  |  0.66    Ca    9.0      20 Oct 2021 05:39  Phos  4.5     10-20  Mg     2.1     10-20    Physical Exam:  Gen - NAD, pleasant and conversant  Head - head wrap in place, in good position, dressings c/d/i  Face - grossly symmetric, +R periorbital ecchymosis, no deformities  Nose - anterior nares clear  OC/OP - MMM, tongue midline, oropharynx clear  Neck - soft, flat, no LAD  Respiratory - breathing comfortably, unlabored breathing    A&P  55F w/ PMH pancreatic cyst, renal cyst, meningioma resection 2016 s/p cranioplasty, revision of hardware, suspension of temporalis. Recovering appropriately post-op    - Care per nsgy  - Leave dressing in place, ENT team to replace 10/21 on morning rounds  - Monitor drain output  - Pain control  - D/w Dr. Thayer

## 2021-10-21 ENCOUNTER — TRANSCRIPTION ENCOUNTER (OUTPATIENT)
Age: 56
End: 2021-10-21

## 2021-10-21 DIAGNOSIS — D62 ACUTE POSTHEMORRHAGIC ANEMIA: ICD-10-CM

## 2021-10-21 DIAGNOSIS — Z98.890 OTHER SPECIFIED POSTPROCEDURAL STATES: ICD-10-CM

## 2021-10-21 LAB
ANION GAP SERPL CALC-SCNC: 6 MMOL/L — SIGNIFICANT CHANGE UP (ref 5–17)
BUN SERPL-MCNC: 18 MG/DL — SIGNIFICANT CHANGE UP (ref 7–23)
CALCIUM SERPL-MCNC: 8.5 MG/DL — SIGNIFICANT CHANGE UP (ref 8.4–10.5)
CHLORIDE SERPL-SCNC: 102 MMOL/L — SIGNIFICANT CHANGE UP (ref 96–108)
CO2 SERPL-SCNC: 29 MMOL/L — SIGNIFICANT CHANGE UP (ref 22–31)
CREAT SERPL-MCNC: 0.78 MG/DL — SIGNIFICANT CHANGE UP (ref 0.5–1.3)
GLUCOSE SERPL-MCNC: 118 MG/DL — HIGH (ref 70–99)
HCT VFR BLD CALC: 33.9 % — LOW (ref 34.5–45)
HGB BLD-MCNC: 11 G/DL — LOW (ref 11.5–15.5)
MAGNESIUM SERPL-MCNC: 2 MG/DL — SIGNIFICANT CHANGE UP (ref 1.6–2.6)
MCHC RBC-ENTMCNC: 29.8 PG — SIGNIFICANT CHANGE UP (ref 27–34)
MCHC RBC-ENTMCNC: 32.4 GM/DL — SIGNIFICANT CHANGE UP (ref 32–36)
MCV RBC AUTO: 91.9 FL — SIGNIFICANT CHANGE UP (ref 80–100)
NRBC # BLD: 0 /100 WBCS — SIGNIFICANT CHANGE UP (ref 0–0)
PHOSPHATE SERPL-MCNC: 2.8 MG/DL — SIGNIFICANT CHANGE UP (ref 2.5–4.5)
PLATELET # BLD AUTO: 212 K/UL — SIGNIFICANT CHANGE UP (ref 150–400)
POTASSIUM SERPL-MCNC: 3.9 MMOL/L — SIGNIFICANT CHANGE UP (ref 3.5–5.3)
POTASSIUM SERPL-SCNC: 3.9 MMOL/L — SIGNIFICANT CHANGE UP (ref 3.5–5.3)
RBC # BLD: 3.69 M/UL — LOW (ref 3.8–5.2)
RBC # FLD: 12 % — SIGNIFICANT CHANGE UP (ref 10.3–14.5)
SODIUM SERPL-SCNC: 137 MMOL/L — SIGNIFICANT CHANGE UP (ref 135–145)
WBC # BLD: 9.4 K/UL — SIGNIFICANT CHANGE UP (ref 3.8–10.5)
WBC # FLD AUTO: 9.4 K/UL — SIGNIFICANT CHANGE UP (ref 3.8–10.5)

## 2021-10-21 PROCEDURE — 99222 1ST HOSP IP/OBS MODERATE 55: CPT

## 2021-10-21 PROCEDURE — 99024 POSTOP FOLLOW-UP VISIT: CPT

## 2021-10-21 RX ORDER — METOCLOPRAMIDE HCL 10 MG
5 TABLET ORAL EVERY 8 HOURS
Refills: 0 | Status: DISCONTINUED | OUTPATIENT
Start: 2021-10-21 | End: 2021-10-22

## 2021-10-21 RX ORDER — OXYCODONE AND ACETAMINOPHEN 5; 325 MG/1; MG/1
1 TABLET ORAL EVERY 4 HOURS
Refills: 0 | Status: DISCONTINUED | OUTPATIENT
Start: 2021-10-21 | End: 2021-10-22

## 2021-10-21 RX ORDER — POTASSIUM CHLORIDE 20 MEQ
20 PACKET (EA) ORAL ONCE
Refills: 0 | Status: COMPLETED | OUTPATIENT
Start: 2021-10-21 | End: 2021-10-21

## 2021-10-21 RX ORDER — TRAMADOL HYDROCHLORIDE 50 MG/1
50 TABLET ORAL EVERY 4 HOURS
Refills: 0 | Status: DISCONTINUED | OUTPATIENT
Start: 2021-10-21 | End: 2021-10-22

## 2021-10-21 RX ADMIN — ONDANSETRON 4 MILLIGRAM(S): 8 TABLET, FILM COATED ORAL at 13:46

## 2021-10-21 RX ADMIN — Medication 100 MILLIGRAM(S): at 06:56

## 2021-10-21 RX ADMIN — SENNA PLUS 2 TABLET(S): 8.6 TABLET ORAL at 21:00

## 2021-10-21 RX ADMIN — ONDANSETRON 4 MILLIGRAM(S): 8 TABLET, FILM COATED ORAL at 07:56

## 2021-10-21 RX ADMIN — TRAMADOL HYDROCHLORIDE 50 MILLIGRAM(S): 50 TABLET ORAL at 07:46

## 2021-10-21 RX ADMIN — Medication 5 MILLIGRAM(S): at 17:34

## 2021-10-21 RX ADMIN — Medication 5 MILLIGRAM(S): at 21:00

## 2021-10-21 RX ADMIN — TRAMADOL HYDROCHLORIDE 50 MILLIGRAM(S): 50 TABLET ORAL at 06:16

## 2021-10-21 RX ADMIN — Medication 20 MILLIEQUIVALENT(S): at 10:20

## 2021-10-21 NOTE — DISCHARGE NOTE PROVIDER - CARE PROVIDERS DIRECT ADDRESSES
,DirectAddress_Unknown,[email or web address]

## 2021-10-21 NOTE — PROGRESS NOTE ADULT - SUBJECTIVE AND OBJECTIVE BOX
Patient is a 55y old  Female who presents with a chief complaint of cranioplasty revision (19 Oct 2021 18:03)      HPI:  Pt is 56 yo female, R handed, PMH: pancreatic cyst, renal cyst, microscopic hematuria, 2016 Right Craniotomy resection of meningioma, presents for an elective  cranioplasty revision, removal of hardware and resuspension of temporalis muscle due to R temporal deformity and pain/discomfort when lays on right side of her head . Pt denies sob, cp, n/v, chills/fevers, dysuria. (19 Oct 2021 11:45)    Subjective:  Reports nausea and recent episode of vomiting. Denies headache, dizziness, SOB, CP, visual changes. ROS is otherwise negative.   Allergies    No Known Allergies    Intolerances    Home meds: none    MEDICATIONS  (STANDING):  bisacodyl 5 milliGRAM(s) Oral at bedtime  senna 2 Tablet(s) Oral at bedtime    MEDICATIONS  (PRN):  metoclopramide Injectable 5 milliGRAM(s) IV Push every 8 hours PRN nausea/vomiting 2nd line  ondansetron Injectable 4 milliGRAM(s) IV Push every 6 hours PRN Nausea and/or Vomiting  oxycodone    5 mG/acetaminophen 325 mG 1 Tablet(s) Oral every 4 hours PRN Severe Pain (7 - 10)  traMADol 50 milliGRAM(s) Oral every 4 hours PRN Moderate Pain (4 - 6)      Drug Dosing Weight  Height (cm): 162.6 (19 Oct 2021 10:12)  Weight (kg): 54.1 (19 Oct 2021 10:12)  BMI (kg/m2): 20.5 (19 Oct 2021 10:12)  BSA (m2): 1.57 (19 Oct 2021 10:12)    PAST MEDICAL & SURGICAL HISTORY:  H/O brain tumor    H/O brain tumor        FAMILY HISTORY:  no cardiac disease    SOCIAL HISTORY:  no smoking  ADVANCE DIRECTIVES:    Vital Signs Last 24 Hrs  T(C): 36.7 (21 Oct 2021 17:03), Max: 37.1 (21 Oct 2021 04:35)  T(F): 98.1 (21 Oct 2021 17:03), Max: 98.8 (21 Oct 2021 04:35)  HR: 78 (21 Oct 2021 12:07) (60 - 88)  BP: 125/59 (21 Oct 2021 12:07) (112/58 - 140/65)  BP(mean): 85 (21 Oct 2021 12:07) (78 - 119)  ABP: --  ABP(mean): --  RR: 18 (21 Oct 2021 12:07) (18 - 19)  SpO2: 98% (21 Oct 2021 12:07) (92% - 99%)          I&O's Detail    20 Oct 2021 07:01  -  21 Oct 2021 07:00  --------------------------------------------------------  IN:    IV PiggyBack: 100 mL    Oral Fluid: 390 mL    sodium chloride 0.9%: 100 mL    sodium chloride 0.9%: 600 mL  Total IN: 1190 mL    OUT:    Bulb (mL): 5 mL    Voided (mL): 2900 mL  Total OUT: 2905 mL    Total NET: -1715 mL      21 Oct 2021 07:01  -  21 Oct 2021 18:05  --------------------------------------------------------  IN:    sodium chloride 0.9%: 200 mL  Total IN: 200 mL    OUT:    Voided (mL): 600 mL  Total OUT: 600 mL    Total NET: -400 mL          PHYSICAL EXAM:      Constitutional: NAD  Eyes: PERRLA  ENMT: MMM  Neck: supple  Back: midline  Respiratory: CTA b/l  Cardiovascular: rrr, s1s2, no m/r/g  Gastrointestinal: soft, NTND, + BS  Extremities: wwp  Vascular: + 2 pulses radial  Neurological: AAO x 4  Skin: no rash, R facial swelling, ecchymosis, head wrapped  Lymph Nodes: no LAD  Musculoskeletal: no joint swelling  Psychiatric: normal affect        LABS:  CBC Full  -  ( 21 Oct 2021 06:48 )  WBC Count : 9.40 K/uL  RBC Count : 3.69 M/uL  Hemoglobin : 11.0 g/dL  Hematocrit : 33.9 %  Platelet Count - Automated : 212 K/uL  Mean Cell Volume : 91.9 fl  Mean Cell Hemoglobin : 29.8 pg  Mean Cell Hemoglobin Concentration : 32.4 gm/dL  Auto Neutrophil # : x  Auto Lymphocyte # : x  Auto Monocyte # : x  Auto Eosinophil # : x  Auto Basophil # : x  Auto Neutrophil % : x  Auto Lymphocyte % : x  Auto Monocyte % : x  Auto Eosinophil % : x  Auto Basophil % : x    10-21    137  |  102  |  18  ----------------------------<  118<H>  3.9   |  29  |  0.78    Ca    8.5      21 Oct 2021 06:48  Phos  2.8     10-21  Mg     2.0     10-21      CAPILLARY BLOOD GLUCOSE      POCT Blood Glucose.: 109 mg/dL (20 Oct 2021 05:30)          EKG:    ECHO, US:        RADIOLOGY:  < from: CT Head No Cont (10.20.21 @ 11:43) >  IMPRESSION:  No acute intracranial hemorrhage.    Post right cranioplasty changes.    < end of copied text >

## 2021-10-21 NOTE — DISCHARGE NOTE PROVIDER - NSDCCPTREATMENT_GEN_ALL_CORE_FT
PRINCIPAL PROCEDURE  Procedure: Cranioplasty for skull defect up to 5 cm diameter  Findings and Treatment: cranioplasty revision, removal of hardware and resuspensionof temporalis muscle for a right frontal and temporal deformity

## 2021-10-21 NOTE — PROGRESS NOTE ADULT - SUBJECTIVE AND OBJECTIVE BOX
HPI:  Pt is 54 yo female, R handed, PMH: pancreatic cyst, renal cyst, microscopic hematuria, 2016 Right Craniotomy resection of meningioma, presents for an elective  cranioplasty revision, removal of hardware and resuspension of temporalis muscle due to R temporal deformity and pain/discomfort when lays on right side of her head . Pt denies sob, cp, n/v, chills/fevers, dysuria. (19 Oct 2021 11:45)    HOSPITAL COURSE:   10/19: s/p cranioplasty revision, removal of hardware and resuspension of temporalis muscle due to R temporal deformity. monitor ERICK drain outputs. pain control.  10/20: POD1, COOPER overnight, neuro stable. Pending CTH in AM. neuro checks q4hrs. dc'd Fentanyl.   10/21: POD2, COOPER overnight, complaining of some pain given dialudid. Per Dr. Mackay, ok to keep on NS and no DVT chemoppx. F/u SG ERICK, likely remove today. PT/OT home no needs.       OVERNIGHT EVENTS:  Vital Signs Last 24 Hrs  T(C): 36.7 (21 Oct 2021 00:30), Max: 37 (20 Oct 2021 08:59)  T(F): 98 (21 Oct 2021 00:30), Max: 98.6 (20 Oct 2021 08:59)  HR: 88 (21 Oct 2021 00:30) (47 - 88)  BP: 138/96 (21 Oct 2021 00:30) (116/60 - 143/66)  BP(mean): 100 (21 Oct 2021 00:30) (82 - 119)  RR: 18 (21 Oct 2021 00:30) (12 - 19)  SpO2: 98% (21 Oct 2021 00:30) (96% - 100%)    I&O's Summary    19 Oct 2021 07:01  -  20 Oct 2021 07:00  --------------------------------------------------------  IN: 650 mL / OUT: 1170 mL / NET: -520 mL    20 Oct 2021 07:01  -  21 Oct 2021 03:25  --------------------------------------------------------  IN: 820 mL / OUT: 2455 mL / NET: -1635 mL        PHYSICAL EXAM:  General: NAD, pt is comfortably sitting up in bed, A&O x3, on RA  HEENT: CN II-XII grossly intact, PERRL 3mm, EOMI b/l, face symmetric, tongue midline, neck FROM, +headwrap   Cardiovascular: RRR, normal S1 and S2   Respiratory: lungs CTAB, no wheezing, rhonchi, or crackles   GI: normoactive BS to auscultation, abd soft, NTND   Neuro: no aphasia, speech clear, no dysmetria, no pronator drift  strength 5/5 throughout all 4 extremities  sensation intact to light touch throughout   Extremities: distal pulses 2+ x4   Wound/incision: R temporal incision site headwrap C/D/I  Drain: SG ERICK x1 to self suction     TUBES/LINES:  [] CVC  [] A-line  [] Lumbar Drain  [] Ventriculostomy  [x] Other R subgaleal ERICK in place    DIET:  [] NPO  [x] Mechanical  [] Tube feeds      LABS:                        12.8   12.10 )-----------( 238      ( 20 Oct 2021 05:39 )             38.9     10-20    140  |  106  |  14  ----------------------------<  124<H>  4.4   |  25  |  0.66    Ca    9.0      20 Oct 2021 05:39  Phos  4.5     10-20  Mg     2.1     10-20              CAPILLARY BLOOD GLUCOSE      POCT Blood Glucose.: 109 mg/dL (20 Oct 2021 05:30)      Drug Levels: [] N/A    CSF Analysis: [] N/A      Allergies    No Known Allergies    Intolerances      MEDICATIONS:  Antibiotics:  ceFAZolin   IVPB 1000 milliGRAM(s) IV Intermittent every 8 hours    Neuro:  ondansetron Injectable 4 milliGRAM(s) IV Push every 6 hours PRN  traMADol 50 milliGRAM(s) Oral every 6 hours PRN    Anticoagulation:    OTHER:  bisacodyl 5 milliGRAM(s) Oral at bedtime  senna 2 Tablet(s) Oral at bedtime    IVF:  sodium chloride 0.9%. 1000 milliLiter(s) IV Continuous <Continuous>    CULTURES:    RADIOLOGY & ADDITIONAL TESTS:      ASSESSMENT:   54 yo female, R handed with PMHx of pancreatic cyst, renal cyst, microscopic hematuria, Right Craniotomy resection of meningioma in 2016, presented for an elective cranioplasty revision, removal of hardware and resuspension of temporalis muscle due to R temporal deformity.       D32.0    Handoff    MEWS Score    H/O brain tumor    History of cranioplasty    History of cranioplasty    Cranioplasty for skull defect up to 5 cm diameter    H/O brain tumor    SysAdmin_VstLnk        PLAN:  NEURO:  - neuro checks q4hr  - monitor ERICK drain output  - Tramadol 50mg PO q6hrs PRN  - Acetaminophen 1g q6hr  - CTH post op performed, post surgical changes     CARDIO:  - -150mmHg     PULM:  - satting well on RA    GI:  - bowel regimen  - regular diet    RENAL:  - normonatremia  - IVF overnight per Dr. Mackay     HEME:  - SCDs  - non DVT chemoprophylaxis as per Dr. Mackay     ID:  - afebrile, monitor for leukocytosis  - post-op Ancef (KEEP ANCEF UNTIL DRAIN REMOVED)    ENDO:  - ISS  - normal glucose goal    DISPO:  - PT/OT: home no needs     Case discussed with Dr. Mackay

## 2021-10-21 NOTE — PROGRESS NOTE ADULT - SUBJECTIVE AND OBJECTIVE BOX
10/20: S/p cranioplasty, placement of cement, revision of hardware, suspension of temporalis. NAEON. Dressings c/d/i. Doing well. ERICK scalp with 10 cc ss output  10/21: NAEON. doing well. ERICK removed on rounds. Dressing changed.      Objective:  VITAL SIGNS:  ICU Vital Signs Last 24 Hrs  T(C): 37.1 (21 Oct 2021 04:35), Max: 37.1 (21 Oct 2021 04:35)  T(F): 98.8 (21 Oct 2021 04:35), Max: 98.8 (21 Oct 2021 04:35)  HR: 62 (21 Oct 2021 03:59) (62 - 88)  BP: 112/58 (21 Oct 2021 03:59) (112/58 - 143/66)  BP(mean): 78 (21 Oct 2021 03:59) (78 - 119)  ABP: --  ABP(mean): --  RR: 18 (21 Oct 2021 03:59) (12 - 19)  SpO2: 92% (21 Oct 2021 03:59) (92% - 100%)      MEDICATIONS  (STANDING):  bisacodyl 5 milliGRAM(s) Oral at bedtime  ceFAZolin   IVPB 1000 milliGRAM(s) IV Intermittent every 8 hours  potassium chloride    Tablet ER 20 milliEquivalent(s) Oral once  senna 2 Tablet(s) Oral at bedtime  sodium chloride 0.9%. 1000 milliLiter(s) (50 mL/Hr) IV Continuous <Continuous>    MEDICATIONS  (PRN):  ondansetron Injectable 4 milliGRAM(s) IV Push every 6 hours PRN Nausea and/or Vomiting  traMADol 50 milliGRAM(s) Oral every 6 hours PRN Moderate Pain (4 - 6)      LABS                         11.0   9.40  )-----------( 212      ( 21 Oct 2021 06:48 )             33.9    10-21    137  |  102  |  18  ----------------------------<  118<H>  3.9   |  29  |  0.78    Ca    8.5      21 Oct 2021 06:48  Phos  2.8     10-21  Mg     2.0     10-21      Physical Exam:  Gen - NAD, pleasant and conversant  Head - head wrap in place, in good position, dressings c/d/i; staple line intact, healing appropriately  Face - grossly symmetric, +R periorbital ecchymosis, no deformities  Nose - anterior nares clear  OC/OP - MMM, tongue midline, oropharynx clear  Neck - soft, flat, no LAD  Respiratory - breathing comfortably, unlabored breathing    A&P  55F w/ PMH pancreatic cyst, renal cyst, meningioma resection 2016 s/p cranioplasty, revision of hardware, suspension of temporalis. Recovering appropriately post-op    - Care per nsgy  - Leave dressing in place  - Dispo per nsgy  - Pain control  - D/w Dr. Thayer

## 2021-10-21 NOTE — PROGRESS NOTE ADULT - ASSESSMENT
54 yo female, R handed with PMHx of pancreatic cyst, renal cyst, microscopic hematuria, Right Craniotomy resection of meningioma in 2016, presented for an elective cranioplasty revision, removal of hardware and resuspension of temporalis muscle due to R temporal deformity.

## 2021-10-21 NOTE — DISCHARGE NOTE PROVIDER - NSDCCPCAREPLAN_GEN_ALL_CORE_FT
PRINCIPAL DISCHARGE DIAGNOSIS  Diagnosis: History of cranioplasty  Assessment and Plan of Treatment:        PRINCIPAL DISCHARGE DIAGNOSIS  Diagnosis: History of cranioplasty  Assessment and Plan of Treatment:       SECONDARY DISCHARGE DIAGNOSES  Diagnosis: Acute blood loss as cause of postoperative anemia  Assessment and Plan of Treatment:

## 2021-10-21 NOTE — DISCHARGE NOTE PROVIDER - NSDCFUADDINST_GEN_ALL_CORE_FT
Neurosurgery follow up:  - please follow up in the office for a wound check in 2 weeks  - please call to make/confirm apt: [phone]    Wound Care:  - please leave incision open to air  - you may shower and wash your hair    Activity:  - fatigue is common after surgery, rest if you feel tired   - no bending, lifting, twisting or heavy lifting   - walking is recommended, ambulate as tolerated  - you may shower when you get home, keep your incision dry  - no bathing   - no driving within 24 hours of anesthesia or while taking prescription pain medications   - keep hydrated, drink plenty of water     Inpatient consults:  - ENT: please follow up with Dr. Thayer outpatient     Please also follow up with your primary care doctor.     Pain Expectations:  - pain after surgery is expected  - please take pain meds as prescribed     Medications:  - continue home meds as prescribed  - pain meds:  - pain medications can cause constipation, you should eat a high fiber diet and may take a stool softener while on pain meds   - Avoid taking Advil (ibuprofen), Motrin (naproxen), or Aspirin for pain as they can cause bleeding     Call the office or come to ED if:  - wound has drainage or bleeding, increased redness or pain at incision site, neurological change, fever (>101), chills, night sweats, syncope, nausea/vomiting      Playback:  - please see [facility] for a copy of your discharge paperwork     WITHIN 24 HOURS OF DISCHARGE, PLEASE CONTACT NEURO PA  WITH ANY QUESTIONS OR CONCERNS: [phone]   OTHERWISE, PLEASE CALL THE OFFICE WITH ANY QUESTIONS OR CONCERNS: [phone] Neurosurgery follow up:  - please follow up in the office for a wound check in 2 weeks  - please call to make/confirm apt: [phone]    Wound Care:  - Please leave head wrap in place and follow up with Dr. Kaur at his [place] office tomorrow morning (10/22/21) - they will call to set up appointment   - Office Address: [address] [place] [address] [address] [place]     Activity:  - fatigue is common after surgery, rest if you feel tired   - no bending, lifting, twisting or heavy lifting   - walking is recommended, ambulate as tolerated  - you may shower when you get home, keep your incision dry  - no bathing   - no driving within 24 hours of anesthesia or while taking prescription pain medications   - keep hydrated, drink plenty of water     Please also follow up with your primary care doctor.     Pain Expectations:  - pain after surgery is expected  - please take pain meds as prescribed     Medications:  - continue home meds as prescribed  - pain meds:  - pain medications can cause constipation, you should eat a high fiber diet and may take a stool softener while on pain meds   - Avoid taking Advil (ibuprofen), Motrin (naproxen), or Aspirin for pain as they can cause bleeding     Call the office or come to ED if:  - wound has drainage or bleeding, increased redness or pain at incision site, neurological change, fever (>101), chills, night sweats, syncope, nausea/vomiting      Playback:  - please see [facility] for a copy of your discharge paperwork     WITHIN 24 HOURS OF DISCHARGE, PLEASE CONTACT NEURO PA  WITH ANY QUESTIONS OR CONCERNS: [phone]   OTHERWISE, PLEASE CALL THE OFFICE WITH ANY QUESTIONS OR CONCERNS: [phone]

## 2021-10-21 NOTE — DISCHARGE NOTE PROVIDER - CARE PROVIDER_API CALL
Rubens Mackay)  Neurosurgery  [address] [place] [address], [address] [place] [address]  Phone: [phone]  Fax: [phone]  Follow Up Time:     Juan Thayer)  Otolaryngology  [address] [place] [address]  Phone: [phone]  Fax: [phone]  Follow Up Time:

## 2021-10-21 NOTE — DISCHARGE NOTE PROVIDER - NSDCFUADDAPPT_GEN_ALL_CORE_FT
Please follow up with Dr. Mackay in the office in 2 weeks    Please follow up with ENT outpatient    Please follow up with your primary care doctor  Please follow up with Dr. Mackay in the office in 2 weeks    Please follow up with ENT outpatient on Monday    Please follow up with your primary care doctor

## 2021-10-21 NOTE — DISCHARGE NOTE PROVIDER - NSDCMRMEDTOKEN_GEN_ALL_CORE_FT
Multi Vitamin+ oral liquid: 1  orally once a day   MiraLax oral powder for reconstitution: 17 gram(s) orally once a day, As Needed -for constipation   Multi Vitamin+ oral liquid: 1  orally once a day  traMADol 50 mg oral tablet: 1 tab(s) orally every 4 hours, As Needed -Moderate Pain (4 - 6) - for severe pain MDD:6 tabs

## 2021-10-22 ENCOUNTER — TRANSCRIPTION ENCOUNTER (OUTPATIENT)
Age: 56
End: 2021-10-22

## 2021-10-22 VITALS — TEMPERATURE: 97 F

## 2021-10-22 LAB
ANION GAP SERPL CALC-SCNC: 8 MMOL/L — SIGNIFICANT CHANGE UP (ref 5–17)
BUN SERPL-MCNC: 9 MG/DL — SIGNIFICANT CHANGE UP (ref 7–23)
CALCIUM SERPL-MCNC: 9.1 MG/DL — SIGNIFICANT CHANGE UP (ref 8.4–10.5)
CHLORIDE SERPL-SCNC: 101 MMOL/L — SIGNIFICANT CHANGE UP (ref 96–108)
CO2 SERPL-SCNC: 27 MMOL/L — SIGNIFICANT CHANGE UP (ref 22–31)
CREAT SERPL-MCNC: 0.6 MG/DL — SIGNIFICANT CHANGE UP (ref 0.5–1.3)
GLUCOSE SERPL-MCNC: 106 MG/DL — HIGH (ref 70–99)
HCT VFR BLD CALC: 34.3 % — LOW (ref 34.5–45)
HGB BLD-MCNC: 11.4 G/DL — LOW (ref 11.5–15.5)
MAGNESIUM SERPL-MCNC: 2.1 MG/DL — SIGNIFICANT CHANGE UP (ref 1.6–2.6)
MCHC RBC-ENTMCNC: 29.6 PG — SIGNIFICANT CHANGE UP (ref 27–34)
MCHC RBC-ENTMCNC: 33.2 GM/DL — SIGNIFICANT CHANGE UP (ref 32–36)
MCV RBC AUTO: 89.1 FL — SIGNIFICANT CHANGE UP (ref 80–100)
NRBC # BLD: 0 /100 WBCS — SIGNIFICANT CHANGE UP (ref 0–0)
PHOSPHATE SERPL-MCNC: 3.5 MG/DL — SIGNIFICANT CHANGE UP (ref 2.5–4.5)
PLATELET # BLD AUTO: 212 K/UL — SIGNIFICANT CHANGE UP (ref 150–400)
POTASSIUM SERPL-MCNC: 4 MMOL/L — SIGNIFICANT CHANGE UP (ref 3.5–5.3)
POTASSIUM SERPL-SCNC: 4 MMOL/L — SIGNIFICANT CHANGE UP (ref 3.5–5.3)
RBC # BLD: 3.85 M/UL — SIGNIFICANT CHANGE UP (ref 3.8–5.2)
RBC # FLD: 11.6 % — SIGNIFICANT CHANGE UP (ref 10.3–14.5)
SODIUM SERPL-SCNC: 136 MMOL/L — SIGNIFICANT CHANGE UP (ref 135–145)
WBC # BLD: 7.53 K/UL — SIGNIFICANT CHANGE UP (ref 3.8–10.5)
WBC # FLD AUTO: 7.53 K/UL — SIGNIFICANT CHANGE UP (ref 3.8–10.5)

## 2021-10-22 PROCEDURE — 82962 GLUCOSE BLOOD TEST: CPT

## 2021-10-22 PROCEDURE — 83735 ASSAY OF MAGNESIUM: CPT

## 2021-10-22 PROCEDURE — 88300 SURGICAL PATH GROSS: CPT

## 2021-10-22 PROCEDURE — 86769 SARS-COV-2 COVID-19 ANTIBODY: CPT

## 2021-10-22 PROCEDURE — 97161 PT EVAL LOW COMPLEX 20 MIN: CPT

## 2021-10-22 PROCEDURE — 80048 BASIC METABOLIC PNL TOTAL CA: CPT

## 2021-10-22 PROCEDURE — 83036 HEMOGLOBIN GLYCOSYLATED A1C: CPT

## 2021-10-22 PROCEDURE — C1713: CPT

## 2021-10-22 PROCEDURE — C1889: CPT

## 2021-10-22 PROCEDURE — 86900 BLOOD TYPING SEROLOGIC ABO: CPT

## 2021-10-22 PROCEDURE — 86901 BLOOD TYPING SEROLOGIC RH(D): CPT

## 2021-10-22 PROCEDURE — 85027 COMPLETE CBC AUTOMATED: CPT

## 2021-10-22 PROCEDURE — 70450 CT HEAD/BRAIN W/O DYE: CPT

## 2021-10-22 PROCEDURE — 84100 ASSAY OF PHOSPHORUS: CPT

## 2021-10-22 PROCEDURE — 36415 COLL VENOUS BLD VENIPUNCTURE: CPT

## 2021-10-22 PROCEDURE — 86850 RBC ANTIBODY SCREEN: CPT

## 2021-10-22 PROCEDURE — 99024 POSTOP FOLLOW-UP VISIT: CPT

## 2021-10-22 RX ORDER — TRAMADOL HYDROCHLORIDE 50 MG/1
1 TABLET ORAL
Qty: 18 | Refills: 0
Start: 2021-10-22 | End: 2021-10-24

## 2021-10-22 RX ORDER — POLYETHYLENE GLYCOL 3350 17 G/17G
17 POWDER, FOR SOLUTION ORAL
Qty: 238 | Refills: 0
Start: 2021-10-22 | End: 2021-11-04

## 2021-10-22 NOTE — PROGRESS NOTE ADULT - SUBJECTIVE AND OBJECTIVE BOX
10/20: S/p cranioplasty, placement of cement, revision of hardware, suspension of temporalis. NAEON. Dressings c/d/i. Doing well. ERICK scalp with 10 cc ss output  10/21: NAEON. doing well. ERICK removed on rounds. Dressing changed.  10-22 NAEON. doing well. Nausea resolved. D/c today    Objective:  VITAL SIGNS:  ICU Vital Signs Last 24 Hrs  T(C): 37 (22 Oct 2021 04:23), Max: 37 (22 Oct 2021 04:23)  T(F): 98.6 (22 Oct 2021 04:23), Max: 98.6 (22 Oct 2021 04:23)  HR: 93 (22 Oct 2021 08:13) (60 - 93)  BP: 127/81 (22 Oct 2021 08:13) (115/60 - 156/75)  BP(mean): 100 (22 Oct 2021 08:13) (85 - 109)  ABP: --  ABP(mean): --  RR: 18 (22 Oct 2021 08:13) (17 - 18)  SpO2: 96% (22 Oct 2021 08:13) (96% - 99%)      MEDICATIONS  (STANDING):  bisacodyl 5 milliGRAM(s) Oral at bedtime  senna 2 Tablet(s) Oral at bedtime    MEDICATIONS  (PRN):  metoclopramide Injectable 5 milliGRAM(s) IV Push every 8 hours PRN nausea/vomiting 2nd line  ondansetron Injectable 4 milliGRAM(s) IV Push every 6 hours PRN Nausea and/or Vomiting  oxycodone    5 mG/acetaminophen 325 mG 1 Tablet(s) Oral every 4 hours PRN Severe Pain (7 - 10)  traMADol 50 milliGRAM(s) Oral every 4 hours PRN Moderate Pain (4 - 6)      LABS                         11.4   7.53  )-----------( 212      ( 22 Oct 2021 07:27 )             34.3    10-22    136  |  101  |  x   ----------------------------<  106<H>  4.0   |  27  |  0.60    Ca    9.1      22 Oct 2021 07:27  Phos  3.5     10-22  Mg     2.1     10-22      Physical Exam:  Gen - NAD, pleasant and conversant  Head - head wrap in place, in good position, dressings c/d/i; staple line intact, healing appropriately  Face - grossly symmetric, +R periorbital ecchymosis, no deformities  Nose - anterior nares clear  OC/OP - MMM, tongue midline, oropharynx clear  Neck - soft, flat, no LAD  Respiratory - breathing comfortably, unlabored breathing    A&P  55F w/ PMH pancreatic cyst, renal cyst, meningioma resection 2016 s/p cranioplasty, revision of hardware, suspension of temporalis. Recovering appropriately post-op    - Care per nsgy  - Dc today  - Leave dressing in place  - Please have patient f/u monday with Dr. Thayer  - Pain control  - D/w Dr. Thayer

## 2021-10-22 NOTE — DISCHARGE NOTE NURSING/CASE MANAGEMENT/SOCIAL WORK - NSDCFUADDAPPT_GEN_ALL_CORE_FT
Please follow up with Dr. Mackay in the office in 2 weeks    Please follow up with ENT outpatient    Please follow up with your primary care doctor

## 2021-10-22 NOTE — DISCHARGE NOTE NURSING/CASE MANAGEMENT/SOCIAL WORK - PATIENT PORTAL LINK FT
You can access the FollowMyHealth Patient Portal offered by [facility] by registering at the following website: [email or web address]. By joining [facility]’s FollowMyHealth portal, you will also be able to view your health information using other applications (apps) compatible with our system.

## 2021-10-22 NOTE — PROGRESS NOTE ADULT - SUBJECTIVE AND OBJECTIVE BOX
HPI:  Pt is 54 yo female, R handed, PMH: pancreatic cyst, renal cyst, microscopic hematuria, 2016 Right Craniotomy resection of meningioma, presents for an elective  cranioplasty revision, removal of hardware and resuspension of temporalis muscle due to R temporal deformity and pain/discomfort when lays on right side of her head . Pt denies sob, cp, n/v, chills/fevers, dysuria. (19 Oct 2021 11:45)  Hospital course:  10/19: s/p cranioplasty revision, removal of hardware and resuspension of temporalis muscle due to R temporal deformity. monitor ERICK drain outputs. pain control.  10/20: POD1, COOPER overnight, neuro stable. Pending CTH in AM. neuro checks q4hrs. dc'd Fentanyl.   10/21: POD2, COOPER overnight, complaining of some pain given dialudid. Per Dr. Mackay, ok to keep on NS and no DVT chemoppx. SG ERICK d/c. PT/OT home no needs. Nausea improved.  10/22: POD3 COOPER o/n, neuro stable      Vital Signs Last 24 Hrs  T(C): 36.8 (21 Oct 2021 21:52), Max: 37.1 (21 Oct 2021 04:35)  T(F): 98.2 (21 Oct 2021 21:52), Max: 98.8 (21 Oct 2021 04:35)  HR: 60 (21 Oct 2021 23:40) (60 - 78)  BP: 155/78 (21 Oct 2021 23:40) (112/58 - 155/78)  BP(mean): 109 (21 Oct 2021 23:40) (78 - 109)  RR: 17 (21 Oct 2021 23:40) (17 - 18)  SpO2: 96% (21 Oct 2021 23:40) (92% - 99%)    I&O's Summary    20 Oct 2021 07:01  -  21 Oct 2021 07:00  --------------------------------------------------------  IN: 1190 mL / OUT: 2905 mL / NET: -1715 mL    21 Oct 2021 07:01  -  22 Oct 2021 01:57  --------------------------------------------------------  IN: 400 mL / OUT: 2100 mL / NET: -1700 mL        PHYSICAL EXAM:  General: patient seen laying supine in bed in NAD  Neuro: AAOx3, FC, OE spontaneously, speech clear and fluent, CNII-XI grossly intact, R facial swelling, no pronator drift, finger to nose intact, strength 5/5 b/l UE and LE, sensation intact to light touch throughout  HEENT: PERRL, EOMI, headwrap in place c/d/i  Neck: supple  Cardiac: RRR, S1S2  Pulmonary: chest rise symmetric  Abdomen: soft, nontender, nondistended  Ext: warm, perfusing well            DIET:  [] NPO  [x] Mechanical  [] Tube feeds    LABS:                        11.0   9.40  )-----------( 212      ( 21 Oct 2021 06:48 )             33.9     10-21    137  |  102  |  18  ----------------------------<  118<H>  3.9   |  29  |  0.78    Ca    8.5      21 Oct 2021 06:48  Phos  2.8     10-21  Mg     2.0     10-21              CAPILLARY BLOOD GLUCOSE          Drug Levels: [] N/A    CSF Analysis: [] N/A      Allergies    No Known Allergies    Intolerances      MEDICATIONS:  Antibiotics:    Neuro:  metoclopramide Injectable 5 milliGRAM(s) IV Push every 8 hours PRN  ondansetron Injectable 4 milliGRAM(s) IV Push every 6 hours PRN  oxycodone    5 mG/acetaminophen 325 mG 1 Tablet(s) Oral every 4 hours PRN  traMADol 50 milliGRAM(s) Oral every 4 hours PRN    Anticoagulation:    OTHER:  bisacodyl 5 milliGRAM(s) Oral at bedtime  senna 2 Tablet(s) Oral at bedtime    IVF:    CULTURES:    RADIOLOGY & ADDITIONAL TESTS:  c< from: CT Head No Cont (10.20.21 @ 11:43) >    IMPRESSION:  No acute intracranial hemorrhage.    Post right cranioplasty changes.    < end of copied text >    D32.0    Handoff    MEWS Score    H/O brain tumor    History of cranioplasty    Cranioplasty for skull defect up to 5 cm diameter    History of cranioplasty    History of cranioplasty    Postoperative state    Acute blood loss as cause of postoperative anemia    Cranioplasty for skull defect up to 5 cm diameter    H/O brain tumor    [email or web address]    Assessment:  54 yo female, R handed with PMHx of pancreatic cyst, renal cyst, microscopic hematuria, Right Craniotomy resection of meningioma in 2016, presented for an elective cranioplasty revision, removal of hardware and resuspension of temporalis muscle due to R temporal deformity.     Plan:  NEURO:  - neuro checks q4hr  - SG ERICK d/dia per ENT   - Headwrap and staples per ENT   - Tramadol 50mg PO q 6hrs PRN  - Acetaminophen 1g q6hr  - CTH post op performed, post surgical changes     CARDIO:  - -150mmHg     PULM:  - satting well on RA    GI:  - bowel regimen  - regular diet    RENAL:  - normonatremia goal    HEME:  - SCDs  - non DVT chemoprophylaxis as per Dr. Mackay     ID:  - afebrile, monitor for leukocytosis    ENDO:    - normal glucose goal    DISPO:  - PT/OT: home no needs     Case discussed with Dr. Mackay       Assessment:  Present when checked    []  GCS  E   V  M     Heart Failure: []Acute, [] acute on chronic , []chronic  Heart Failure:  [] Diastolic (HFpEF), [] Systolic (HFrEF), []Combined (HFpEF and HFrEF), [] RHF, [] Pulm HTN, [] Other    [] NADEEM, [] ATN, [] AIN, [] other  [] CKD1, [] CKD2, [] CKD 3, [] CKD 4, [] CKD 5, []ESRD    Encephalopathy: [] Metabolic, [] Hepatic, [] toxic, [] Neurological, [] Other    Abnormal Nurtitional Status: [] malnurtition (see nutrition note), [ ]underweight: BMI < 19, [] morbid obesity: BMI >40, [] Cachexia    [] Sepsis  [] hypovolemic shock,[] cardiogenic shock, [] hemorrhagic shock, [] neuogenic shock  [] Acute Respiratory Failure  []Cerebral edema, [] Brain compression/ herniation,   [] Functional quadriplegia  [] Acute blood loss anemia

## 2021-10-22 NOTE — DIETITIAN INITIAL EVALUATION ADULT. - OTHER INFO
Pt is 56 yo female, R handed, PMH: pancreatic cyst, renal cyst, microscopic hematuria, 2016 Right Craniotomy resection of meningioma. Presented to [facility] for cranioplasty. 10/20: S/p cranioplasty, placement of cement, revision of hardware, suspension of temporalis due to R temporal deformity and pain/discomfort when lays on right side of her head. 10/21: POD2, COOPER overnight keep on NS and no DVT chemoppx. SG ERICK d/c. PT/OT home no needs. Nausea improved. 10/22: POD3 COOPER o/n, neuro stable. Pt denies sob, cp, n/v, chills/fevers, dysuria.     Currently on regular diet, no feeding assistant. NKFA.  Pain:   pref  Skin: no rash, R facial swelling, ecchymosis, head wrapped. Oswaldo 22. No edema noted.+BM 10/18 on bowel regimen PRN
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
## 2021-11-03 DIAGNOSIS — D72.828 OTHER ELEVATED WHITE BLOOD CELL COUNT: ICD-10-CM

## 2021-11-03 DIAGNOSIS — Z42.8 ENCOUNTER FOR OTHER PLASTIC AND RECONSTRUCTIVE SURGERY FOLLOWING MEDICAL PROCEDURE OR HEALED INJURY: ICD-10-CM

## 2021-11-03 DIAGNOSIS — T84.84XA PAIN DUE TO INTERNAL ORTHOPEDIC PROSTHETIC DEVICES, IMPLANTS AND GRAFTS, INITIAL ENCOUNTER: ICD-10-CM

## 2021-11-03 DIAGNOSIS — D62 ACUTE POSTHEMORRHAGIC ANEMIA: ICD-10-CM
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
## 2021-11-03 DIAGNOSIS — M62.48 CONTRACTURE OF MUSCLE, OTHER SITE: ICD-10-CM

## 2021-11-05 LAB — SURGICAL PATHOLOGY STUDY: SIGNIFICANT CHANGE UP

## 2022-12-05 NOTE — OCCUPATIONAL THERAPY INITIAL EVALUATION ADULT - UPPER BODY DRESSING, PREVIOUS LEVEL OF FUNCTION, OT EVAL
Problem: Non-Pressure Injury Wound  Goal: # No deterioration in wound  Outcome: Outcome Met, Continue evaluating goal progress toward completion  Goal: # Verbalizes understanding of wound and wound care  Description: If abnormality is a skin tear, avoid using tape on skin including transparent dressings. Document education using the patient education activity.  Outcome: Outcome Met, Continue evaluating goal progress toward completion     No packing found within the wound, however small piece of packing loose and at surface of incision. Wound repacked at 1200 and 0600. Central area of wound walls attached at this time. Tolerated well.   independent

## 2025-07-27 ENCOUNTER — NON-APPOINTMENT (OUTPATIENT)
Age: 60
End: 2025-07-27